# Patient Record
Sex: MALE | Race: WHITE | NOT HISPANIC OR LATINO | Employment: FULL TIME | URBAN - METROPOLITAN AREA
[De-identification: names, ages, dates, MRNs, and addresses within clinical notes are randomized per-mention and may not be internally consistent; named-entity substitution may affect disease eponyms.]

---
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
## 2020-09-27 ENCOUNTER — APPOINTMENT (OUTPATIENT)
Dept: GENERAL RADIOLOGY | Facility: HOSPITAL | Age: 20
End: 2020-09-27

## 2020-09-27 ENCOUNTER — HOSPITAL ENCOUNTER (EMERGENCY)
Facility: HOSPITAL | Age: 20
Discharge: HOME | End: 2020-09-27
Attending: EMERGENCY MEDICINE | Admitting: EMERGENCY MEDICINE

## 2020-09-27 VITALS
RESPIRATION RATE: 16 BRPM | HEART RATE: 100 BPM | OXYGEN SATURATION: 99 % | HEIGHT: 74 IN | SYSTOLIC BLOOD PRESSURE: 118 MMHG | WEIGHT: 180.78 LBS | TEMPERATURE: 98.1 F | BODY MASS INDEX: 23.2 KG/M2 | DIASTOLIC BLOOD PRESSURE: 58 MMHG

## 2020-09-27 DIAGNOSIS — T14.8XXA ABRASION: ICD-10-CM

## 2020-09-27 DIAGNOSIS — S93.401A SPRAIN OF RIGHT ANKLE, UNSPECIFIED LIGAMENT, INITIAL ENCOUNTER: Primary | ICD-10-CM

## 2020-09-27 PROCEDURE — 73610 X-RAY EXAM OF ANKLE: CPT

## 2020-09-27 PROCEDURE — 99283 EMERGENCY DEPT VISIT LOW MDM: CPT

## 2020-09-27 RX ORDER — KETOROLAC TROMETHAMINE 15 MG/ML
15 INJECTION, SOLUTION INTRAMUSCULAR; INTRAVENOUS ONCE
Status: DISCONTINUED | OUTPATIENT
Start: 2020-09-27 | End: 2020-09-27

## 2020-09-27 RX ORDER — DICLOFENAC SODIUM 75 MG/1
75 TABLET, DELAYED RELEASE ORAL 2 TIMES DAILY
Qty: 20 TABLET | Refills: 0 | Status: SHIPPED | OUTPATIENT
Start: 2020-09-27 | End: 2021-05-19

## 2021-05-19 ENCOUNTER — OFFICE VISIT (OUTPATIENT)
Dept: ORTHOPEDIC SURGERY | Facility: CLINIC | Age: 21
End: 2021-05-19

## 2021-05-19 VITALS
DIASTOLIC BLOOD PRESSURE: 56 MMHG | BODY MASS INDEX: 22.72 KG/M2 | HEART RATE: 56 BPM | SYSTOLIC BLOOD PRESSURE: 117 MMHG | HEIGHT: 74 IN | WEIGHT: 177 LBS

## 2021-05-19 DIAGNOSIS — S23.29XA: ICD-10-CM

## 2021-05-19 DIAGNOSIS — R07.81 RIB PAIN: Primary | ICD-10-CM

## 2021-05-19 PROCEDURE — 99203 OFFICE O/P NEW LOW 30 MIN: CPT | Performed by: FAMILY MEDICINE

## 2021-05-19 NOTE — PROGRESS NOTES
"Primary Care Sports Medicine Office Visit Note     Patient ID: Pankaj Tan is a 20 y.o. male.    Chief Complaint:  Chief Complaint   Patient presents with   • left lower rib pain     PT noticed a lump last year     HPI:    Mr. Pankaj Tan is a 20 y.o. male who presents to the clinic today for L lower chest wall pain. He first noticed a lump in this area last year. Noticed his rib cage \"pops out\" more in the inferior margin, than normal/compared to the contralateral. He denies any specific injury back then. No obvious trauma to this area previously. This past weekend he was playing softball and ran into a fence, now achy pain to this area since. Has not taken any medication for this issue. Simple rest, icing.     No past medical history on file.    No past surgical history on file.    No family history on file.  Social History     Occupational History   • Not on file   Tobacco Use   • Smoking status: Never Smoker   • Smokeless tobacco: Never Used   Substance and Sexual Activity   • Alcohol use: Not on file   • Drug use: Not on file   • Sexual activity: Not on file      Review of Systems   Constitutional: Negative for activity change and fever.   Respiratory: Negative for cough and shortness of breath.    Cardiovascular: Negative for chest pain.   Gastrointestinal: Negative for constipation, diarrhea, nausea and vomiting.   Musculoskeletal: Positive for arthralgias.   Skin: Negative for color change and rash.   Neurological: Negative for weakness.   Hematological: Does not bruise/bleed easily.     Objective:    /56   Pulse 56   Ht 188 cm (74\")   Wt 80.3 kg (177 lb)   BMI 22.73 kg/m²     Physical Examination:  Physical Exam  Vitals and nursing note reviewed.   Constitutional:       General: He is not in acute distress.     Appearance: He is well-developed. He is not diaphoretic.   HENT:      Head: Normocephalic and atraumatic.   Eyes:      Conjunctiva/sclera: Conjunctivae normal.   Pulmonary:      " "Effort: Pulmonary effort is normal. No respiratory distress.      Comments: Moderate prominence to the inferiormost L sided anterior ribs (7,8,9,10) ribs, with pain and tenderness to palpation of conjoined costochondral junction in the anterior aspect of R7.  Chest:      Chest wall: No tenderness.   Skin:     General: Skin is warm.      Capillary Refill: Capillary refill takes less than 2 seconds.   Neurological:      Mental Status: He is alert.       Ortho Exam   N/A.  Please see chest examination above.    Imaging and other tests:  3V CXR yeilds no acute bony abnromality. Mild contor irrgularity near the area of concern on the lateral view, at the inferior angle of the area of costochondral junction of the seventh rib.    Assessment and Plan:    1. Rib pain  - XR Chest 2 View; Future    2. Closed traumatic dislocation of costochondral joint, initial encounter    I discussed with the patient that I think he simply previously dislocated his seventh rib conjoined chondral cartilage, and now has mild prominence in this area.  It is mildly achy, and is not bothering him significantly, but he was concerned with this appearance.  I discussed that short of the cosmetic change from one side to the other, he should not have significant issue with this.  Occasional inflammation/irritation with athletic activity or sport will likely happen.  At those times, RTC/contact me and we can discuss anti-inflammatory over-the-counter medication or more if needed.  Patient verbalized understanding.  RTC as needed.    Anastacio FRANCE \"Chance\" Dmitri GARCIA DO, CAQSM  05/19/21  17:46 EDT    Disclaimer: Please note that areas of this note were completed with computer voice recognition software.  Quite often unanticipated grammatical, syntax, homophones, and other interpretive errors are inadvertently transcribed by the computer software. Please excuse any errors that have escaped final proofreading.  "

## 2023-08-21 ENCOUNTER — TELEPHONE (OUTPATIENT)
Dept: URGENT CARE | Facility: CLINIC | Age: 23
End: 2023-08-21
Payer: COMMERCIAL